# Patient Record
Sex: FEMALE | Race: WHITE | Employment: FULL TIME | URBAN - METROPOLITAN AREA
[De-identification: names, ages, dates, MRNs, and addresses within clinical notes are randomized per-mention and may not be internally consistent; named-entity substitution may affect disease eponyms.]

---

## 2017-08-31 ENCOUNTER — OFFICE VISIT (OUTPATIENT)
Dept: OBGYN CLINIC | Age: 47
End: 2017-08-31
Payer: COMMERCIAL

## 2017-08-31 VITALS
WEIGHT: 256.8 LBS | HEIGHT: 63 IN | SYSTOLIC BLOOD PRESSURE: 134 MMHG | DIASTOLIC BLOOD PRESSURE: 70 MMHG | BODY MASS INDEX: 45.5 KG/M2

## 2017-08-31 DIAGNOSIS — Z01.419 VISIT FOR GYNECOLOGIC EXAMINATION: Primary | ICD-10-CM

## 2017-08-31 DIAGNOSIS — N89.8 VAGINA ITCHING: ICD-10-CM

## 2017-08-31 DIAGNOSIS — Z12.31 ENCOUNTER FOR SCREENING MAMMOGRAM FOR BREAST CANCER: ICD-10-CM

## 2017-08-31 PROCEDURE — 99396 PREV VISIT EST AGE 40-64: CPT | Performed by: OBSTETRICS & GYNECOLOGY

## 2017-08-31 RX ORDER — GLIPIZIDE 5 MG/1
TABLET, EXTENDED RELEASE ORAL
COMMUNITY
Start: 2017-06-06 | End: 2017-11-30 | Stop reason: ALTCHOICE

## 2017-08-31 RX ORDER — LEVOTHYROXINE SODIUM 112 MCG
224 TABLET ORAL
COMMUNITY
Start: 2017-07-02

## 2017-08-31 RX ORDER — FLUCONAZOLE 150 MG/1
TABLET ORAL
COMMUNITY
Start: 2017-06-06 | End: 2017-08-31 | Stop reason: ALTCHOICE

## 2017-08-31 RX ORDER — FLUOXETINE HYDROCHLORIDE 40 MG/1
CAPSULE ORAL
COMMUNITY
Start: 2017-08-01 | End: 2019-02-06 | Stop reason: DRUGHIGH

## 2017-08-31 ASSESSMENT — ENCOUNTER SYMPTOMS
ABDOMINAL DISTENTION: 0
CONSTIPATION: 0
SHORTNESS OF BREATH: 0
COUGH: 0
DIARRHEA: 0
ABDOMINAL PAIN: 0
BACK PAIN: 0

## 2017-09-01 ENCOUNTER — TELEPHONE (OUTPATIENT)
Dept: OBGYN CLINIC | Age: 47
End: 2017-09-01

## 2017-09-01 DIAGNOSIS — N89.8 VAGINA ITCHING: ICD-10-CM

## 2017-09-01 DIAGNOSIS — B37.31 YEAST VAGINITIS: Primary | ICD-10-CM

## 2017-09-01 RX ORDER — FLUCONAZOLE 150 MG/1
150 TABLET ORAL
Qty: 4 TABLET | Refills: 0 | Status: SHIPPED | OUTPATIENT
Start: 2017-09-01 | End: 2017-11-30 | Stop reason: ALTCHOICE

## 2017-09-21 DIAGNOSIS — Z01.419 VISIT FOR GYNECOLOGIC EXAMINATION: ICD-10-CM

## 2017-09-26 ENCOUNTER — TELEPHONE (OUTPATIENT)
Dept: OBGYN CLINIC | Age: 47
End: 2017-09-26

## 2017-11-22 ENCOUNTER — TELEPHONE (OUTPATIENT)
Dept: OBGYN CLINIC | Age: 47
End: 2017-11-22

## 2017-11-22 NOTE — TELEPHONE ENCOUNTER
patient using preparation H cream and suppositories for hemoorhoids, thinks this has caused a yeast infection. Wonders if med could be sent to her pharmacy she would like more than one dose as she will be treating her hemorrhoids .  Pharmacy confirmed patient [phone]

## 2017-11-30 ENCOUNTER — OFFICE VISIT (OUTPATIENT)
Dept: OBGYN CLINIC | Age: 47
End: 2017-11-30
Payer: COMMERCIAL

## 2017-11-30 VITALS
WEIGHT: 255.4 LBS | HEIGHT: 63 IN | DIASTOLIC BLOOD PRESSURE: 68 MMHG | BODY MASS INDEX: 45.25 KG/M2 | SYSTOLIC BLOOD PRESSURE: 100 MMHG

## 2017-11-30 DIAGNOSIS — N76.0 ACUTE VAGINITIS: Primary | ICD-10-CM

## 2017-11-30 PROCEDURE — 99213 OFFICE O/P EST LOW 20 MIN: CPT | Performed by: NURSE PRACTITIONER

## 2017-12-01 DIAGNOSIS — N76.0 ACUTE VAGINITIS: ICD-10-CM

## 2017-12-01 RX ORDER — FLUCONAZOLE 150 MG/1
150 TABLET ORAL ONCE
Qty: 1 TABLET | Refills: 2 | Status: SHIPPED | OUTPATIENT
Start: 2017-12-01 | End: 2017-12-01

## 2019-02-06 ENCOUNTER — APPOINTMENT (OUTPATIENT)
Dept: GENERAL RADIOLOGY | Age: 49
End: 2019-02-06
Payer: COMMERCIAL

## 2019-02-06 ENCOUNTER — HOSPITAL ENCOUNTER (EMERGENCY)
Age: 49
Discharge: HOME | End: 2019-02-06
Attending: EMERGENCY MEDICINE
Payer: COMMERCIAL

## 2019-02-06 VITALS
OXYGEN SATURATION: 93 % | BODY MASS INDEX: 46.07 KG/M2 | WEIGHT: 260 LBS | SYSTOLIC BLOOD PRESSURE: 105 MMHG | RESPIRATION RATE: 16 BRPM | HEART RATE: 101 BPM | HEIGHT: 63 IN | DIASTOLIC BLOOD PRESSURE: 87 MMHG | TEMPERATURE: 98.4 F

## 2019-02-06 DIAGNOSIS — I47.1 PAROXYSMAL SUPRAVENTRICULAR TACHYCARDIA (HCC): ICD-10-CM

## 2019-02-06 DIAGNOSIS — E03.9 HYPOTHYROIDISM, UNSPECIFIED TYPE: Primary | ICD-10-CM

## 2019-02-06 LAB
-: NORMAL
ABSOLUTE EOS #: 0.2 K/UL (ref 0–0.4)
ABSOLUTE IMMATURE GRANULOCYTE: NORMAL K/UL (ref 0–0.3)
ABSOLUTE LYMPH #: 2.7 K/UL (ref 1–4.8)
ABSOLUTE MONO #: 0.3 K/UL (ref 0.2–0.8)
AMORPHOUS: NORMAL
AMPHETAMINE SCREEN URINE: NEGATIVE
ANION GAP SERPL CALCULATED.3IONS-SCNC: 19 MMOL/L (ref 9–17)
BACTERIA: NORMAL
BARBITURATE SCREEN URINE: NEGATIVE
BASOPHILS # BLD: 1 % (ref 0–2)
BASOPHILS ABSOLUTE: 0 K/UL (ref 0–0.2)
BENZODIAZEPINE SCREEN, URINE: NEGATIVE
BILIRUBIN URINE: NEGATIVE
BUN BLDV-MCNC: 9 MG/DL (ref 6–20)
BUN/CREAT BLD: 18 (ref 9–20)
BUPRENORPHINE URINE: NORMAL
CALCIUM SERPL-MCNC: 9 MG/DL (ref 8.6–10.4)
CANNABINOID SCREEN URINE: NEGATIVE
CASTS UA: NORMAL /LPF
CHLORIDE BLD-SCNC: 98 MMOL/L (ref 98–107)
CHP ED QC CHECK: NORMAL
CO2: 21 MMOL/L (ref 20–31)
COCAINE METABOLITE, URINE: NEGATIVE
COLOR: YELLOW
COMMENT UA: ABNORMAL
CREAT SERPL-MCNC: 0.5 MG/DL (ref 0.5–0.9)
CRYSTALS, UA: NORMAL /HPF
DIFFERENTIAL TYPE: NORMAL
EOSINOPHILS RELATIVE PERCENT: 3 % (ref 1–4)
EPITHELIAL CELLS UA: NORMAL /HPF (ref 0–5)
GFR AFRICAN AMERICAN: >60 ML/MIN
GFR NON-AFRICAN AMERICAN: >60 ML/MIN
GFR SERPL CREATININE-BSD FRML MDRD: ABNORMAL ML/MIN/{1.73_M2}
GFR SERPL CREATININE-BSD FRML MDRD: ABNORMAL ML/MIN/{1.73_M2}
GLUCOSE BLD-MCNC: 282 MG/DL (ref 70–99)
GLUCOSE URINE: ABNORMAL
HCT VFR BLD CALC: 41.5 % (ref 36–46)
HEMOGLOBIN: 13.8 G/DL (ref 12–16)
IMMATURE GRANULOCYTES: NORMAL %
INR BLD: 1.1
KETONES, URINE: NEGATIVE
LEUKOCYTE ESTERASE, URINE: NEGATIVE
LYMPHOCYTES # BLD: 42 % (ref 24–44)
MCH RBC QN AUTO: 27.1 PG (ref 26–34)
MCHC RBC AUTO-ENTMCNC: 33.2 G/DL (ref 31–37)
MCV RBC AUTO: 81.5 FL (ref 80–100)
MDMA URINE: NORMAL
METHADONE SCREEN, URINE: NEGATIVE
METHAMPHETAMINE, URINE: NORMAL
MONOCYTES # BLD: 5 % (ref 1–7)
MUCUS: NORMAL
NITRITE, URINE: NEGATIVE
NRBC AUTOMATED: NORMAL PER 100 WBC
OPIATES, URINE: NEGATIVE
OTHER OBSERVATIONS UA: NORMAL
OXYCODONE SCREEN URINE: NEGATIVE
PARTIAL THROMBOPLASTIN TIME: 26.2 SEC (ref 23–31)
PDW BLD-RTO: 14 % (ref 11.5–14.5)
PH UA: 5 (ref 5–8)
PHENCYCLIDINE, URINE: NEGATIVE
PLATELET # BLD: 394 K/UL (ref 130–400)
PLATELET ESTIMATE: NORMAL
PMV BLD AUTO: 6.5 FL (ref 6–12)
POTASSIUM SERPL-SCNC: 3.8 MMOL/L (ref 3.7–5.3)
PREGNANCY TEST URINE, POC: NEGATIVE
PROPOXYPHENE, URINE: NORMAL
PROTEIN UA: ABNORMAL
PROTHROMBIN TIME: 11.2 SEC (ref 9.7–11.6)
RBC # BLD: 5.09 M/UL (ref 4–5.2)
RBC # BLD: NORMAL 10*6/UL
RBC UA: NORMAL /HPF (ref 0–2)
RENAL EPITHELIAL, UA: NORMAL /HPF
SEG NEUTROPHILS: 49 % (ref 36–66)
SEGMENTED NEUTROPHILS ABSOLUTE COUNT: 3.3 K/UL (ref 1.8–7.7)
SODIUM BLD-SCNC: 138 MMOL/L (ref 135–144)
SPECIFIC GRAVITY UA: 1 (ref 1–1.03)
T4 TOTAL: 5.5 UG/DL (ref 4.5–12)
TEST INFORMATION: NORMAL
TRICHOMONAS: NORMAL
TRICYCLIC ANTIDEPRESSANTS, UR: NORMAL
TROPONIN INTERP: NORMAL
TROPONIN T: NORMAL NG/ML
TROPONIN, HIGH SENSITIVITY: <6 NG/L (ref 0–14)
TSH SERPL DL<=0.05 MIU/L-ACNC: 23.27 MIU/L (ref 0.3–5)
TURBIDITY: CLEAR
URINE HGB: NEGATIVE
UROBILINOGEN, URINE: NORMAL
WBC # BLD: 6.6 K/UL (ref 3.5–11)
WBC # BLD: NORMAL 10*3/UL
WBC UA: NORMAL /HPF (ref 0–5)
YEAST: NORMAL

## 2019-02-06 PROCEDURE — 84436 ASSAY OF TOTAL THYROXINE: CPT

## 2019-02-06 PROCEDURE — 99285 EMERGENCY DEPT VISIT HI MDM: CPT

## 2019-02-06 PROCEDURE — 80048 BASIC METABOLIC PNL TOTAL CA: CPT

## 2019-02-06 PROCEDURE — 80307 DRUG TEST PRSMV CHEM ANLYZR: CPT

## 2019-02-06 PROCEDURE — 36415 COLL VENOUS BLD VENIPUNCTURE: CPT

## 2019-02-06 PROCEDURE — 85025 COMPLETE CBC W/AUTO DIFF WBC: CPT

## 2019-02-06 PROCEDURE — 84703 CHORIONIC GONADOTROPIN ASSAY: CPT

## 2019-02-06 PROCEDURE — 81001 URINALYSIS AUTO W/SCOPE: CPT

## 2019-02-06 PROCEDURE — 85730 THROMBOPLASTIN TIME PARTIAL: CPT

## 2019-02-06 PROCEDURE — 2580000003 HC RX 258: Performed by: EMERGENCY MEDICINE

## 2019-02-06 PROCEDURE — 71045 X-RAY EXAM CHEST 1 VIEW: CPT

## 2019-02-06 PROCEDURE — 84484 ASSAY OF TROPONIN QUANT: CPT

## 2019-02-06 PROCEDURE — 85610 PROTHROMBIN TIME: CPT

## 2019-02-06 PROCEDURE — 93005 ELECTROCARDIOGRAM TRACING: CPT

## 2019-02-06 PROCEDURE — 6370000000 HC RX 637 (ALT 250 FOR IP): Performed by: EMERGENCY MEDICINE

## 2019-02-06 PROCEDURE — 84443 ASSAY THYROID STIM HORMONE: CPT

## 2019-02-06 RX ORDER — LEVOTHYROXINE SODIUM 0.05 MG/1
50 TABLET ORAL ONCE
Status: COMPLETED | OUTPATIENT
Start: 2019-02-06 | End: 2019-02-06

## 2019-02-06 RX ORDER — DILTIAZEM HYDROCHLORIDE 240 MG/1
240 CAPSULE, COATED, EXTENDED RELEASE ORAL 2 TIMES DAILY
Qty: 60 CAPSULE | Refills: 0 | Status: SHIPPED | OUTPATIENT
Start: 2019-02-06

## 2019-02-06 RX ORDER — GLIMEPIRIDE 4 MG/1
4 TABLET ORAL
COMMUNITY

## 2019-02-06 RX ORDER — QUETIAPINE FUMARATE 25 MG/1
25 TABLET, FILM COATED ORAL NIGHTLY
COMMUNITY

## 2019-02-06 RX ORDER — FLUOXETINE HYDROCHLORIDE 60 MG/1
60 TABLET, FILM COATED ORAL; ORAL EVERY MORNING
COMMUNITY

## 2019-02-06 RX ORDER — 0.9 % SODIUM CHLORIDE 0.9 %
1000 INTRAVENOUS SOLUTION INTRAVENOUS ONCE
Status: COMPLETED | OUTPATIENT
Start: 2019-02-06 | End: 2019-02-06

## 2019-02-06 RX ADMIN — SODIUM CHLORIDE 1000 ML: 9 INJECTION, SOLUTION INTRAVENOUS at 08:50

## 2019-02-06 RX ADMIN — LEVOTHYROXINE SODIUM 50 MCG: 50 TABLET ORAL at 10:26

## 2019-02-06 ASSESSMENT — ENCOUNTER SYMPTOMS
BACK PAIN: 0
ABDOMINAL PAIN: 0
EYE DISCHARGE: 0
FACIAL SWELLING: 0
CHEST TIGHTNESS: 0
SHORTNESS OF BREATH: 0
EYE PAIN: 0
ABDOMINAL DISTENTION: 0

## 2019-02-07 LAB
EKG ATRIAL RATE: 101 BPM
EKG P AXIS: 53 DEGREES
EKG P-R INTERVAL: 152 MS
EKG Q-T INTERVAL: 362 MS
EKG QRS DURATION: 86 MS
EKG QTC CALCULATION (BAZETT): 469 MS
EKG R AXIS: 12 DEGREES
EKG T AXIS: 40 DEGREES
EKG VENTRICULAR RATE: 101 BPM
HCG, PREGNANCY URINE (POC): NEGATIVE

## 2019-12-24 ENCOUNTER — HOSPITAL ENCOUNTER (OUTPATIENT)
Dept: PHYSICAL THERAPY | Facility: CLINIC | Age: 49
Setting detail: THERAPIES SERIES
Discharge: HOME | End: 2019-12-24
Payer: COMMERCIAL

## 2019-12-24 PROCEDURE — 97110 THERAPEUTIC EXERCISES: CPT

## 2019-12-24 PROCEDURE — 97140 MANUAL THERAPY 1/> REGIONS: CPT

## 2019-12-24 PROCEDURE — 97161 PT EVAL LOW COMPLEX 20 MIN: CPT

## 2019-12-30 ENCOUNTER — HOSPITAL ENCOUNTER (OUTPATIENT)
Dept: PHYSICAL THERAPY | Facility: CLINIC | Age: 49
Setting detail: THERAPIES SERIES
Discharge: HOME | End: 2019-12-30
Payer: COMMERCIAL

## 2019-12-30 PROCEDURE — 97110 THERAPEUTIC EXERCISES: CPT

## 2019-12-30 PROCEDURE — 97140 MANUAL THERAPY 1/> REGIONS: CPT

## 2019-12-31 ENCOUNTER — HOSPITAL ENCOUNTER (OUTPATIENT)
Dept: PHYSICAL THERAPY | Facility: CLINIC | Age: 49
Setting detail: THERAPIES SERIES
Discharge: HOME | End: 2019-12-31
Payer: COMMERCIAL

## 2020-01-02 ENCOUNTER — HOSPITAL ENCOUNTER (OUTPATIENT)
Dept: PHYSICAL THERAPY | Facility: CLINIC | Age: 50
Setting detail: THERAPIES SERIES
Discharge: HOME | End: 2020-01-02
Payer: COMMERCIAL

## 2020-01-02 PROCEDURE — 97110 THERAPEUTIC EXERCISES: CPT

## 2020-01-02 PROCEDURE — 97140 MANUAL THERAPY 1/> REGIONS: CPT

## 2020-01-02 NOTE — FLOWSHEET NOTE
[] [facility]) [facility] &  Therapy  [address]  [phone]  F: [phone] [x] [address]  [facility] [address]   [address]  P: [phone]  F: [phone] [] [address]  [address] [facility]  P: [phone]  F: [phone] [] [address]  [facility]   [address]   [place]: [phone]  F: [phone]      Physical Therapy Daily Treatment Note    Date:  2020  Patient Name:  Venu Latif    :  1970  MRN: [id number]  Physician: Shira Saenz MD                       Insurance: [id number] (3XH)  Medical Diagnosis: R knee sprain               Rehab Codes: M25.561, M25.661, M62.81, R26.2  Onset date: 19                         Next 's appt. : 19  Visit# / total visits: 3/   Cancels/No Shows: 1/0    Subjective:    Pain:  [x] Yes  [] No Location: R knee Pain Rating: (0-10 scale) 4/10  Pain altered Tx:  [] No  [] Yes  Action:  Comments: Pt reports having relief from pain for a day and a half after the previous session.      Objective:   Modalities:   Precautions: no squatting/kneeling  Exercises:  Exercise Reps/ Time Weight/ Level Comments   SciFit 5' L1 Not today         Supine      HS stretch 30\"x2   With strap   rectus/hip flexor stretch 30\"x3   Leg over EOB   Quad sets 5\"x15     SAQ 5\"x15     SLR --  Intense pain   gastroc stretch 30\"x3  Long sitting with strap   Hip add 5\"x15 ball    Hip abd 5\"x15 lime    bridges x15           SL      clamshells x10                 Prone      Quad stretch 30\"x3  With strap   HS curls x15     Hip ext x10  Knee extended         Standing      slantboard 30\"x3     3 way hip x15 ew lime          Other: SL hip abd increased pain     Manual therapy: \"the stick\" to the R quad, IT band, hamstring, gastroc x12' (5' of this time spent for just quad)     Specific Instructions for next treatment: Progress strengthening as tolerated, but avoid high levels of pain. Continue with stretches for RLE. Continue with manual therapy and consider adding for IT band/HS/gastroc. May consider adding resistance to some exercises next session       Treatment Charges: Mins Units Time In/Out   []  Modalities        [x]  Ther Exercise 32 2 6168-2192   [x]  Manual Therapy 12 1 2881-4051   []  Ther Activities      []  Aquatics      []  Vasocompression      []  Cervical Traction      Total Treatment time 44 min 3           Assessment: [x] Progressing toward goals. Able to progress pt today with bridges and standing exercises today with good pt tolerance and no increase in pain. Pt with good recall of exercises. Pt with reports that after exercise today, her pain had decreased to a 3/10 after manual her pain had decreased to a 0/10. Pt would continue to benefit from skilled PT interventions to decrease pain, increase strength, ROM, and overall functional mobility for improved quality of life. [] No change. [] Other:    STG: (to be met in 6 treatments)  1. ? Pain: Pt to decrease pain to no greater than 2/10 to improve quality of life. 2. ? Strength: Pt to increase R hip and knee strength to 4+/5 for all measurements to increase strength for amb/walking  3. ? Function: Pt to report increase in ability to negotiate stairs by 50% as well as complete sit <-> stand transfers without increase in pain to return to PLOF. 4. Independent with Home Exercise Programs  5. Demonstrate Knowledge of fall prevention  LTG: (to be met in 9 treatments)  6. ? Pain: Pt to decrease pain to no greater than 1/10 to improve quality of life. 7. ? Strength: Pt to increase R hip and knee strength to 5/5 for all measurements to increase strength for amb/walking  8. ? Function: Pt to improve LEFS score to no greater than 16% impairment to decrease pts perception of disability.   9. Pt to negotiate

## 2020-01-06 ENCOUNTER — HOSPITAL ENCOUNTER (OUTPATIENT)
Dept: PHYSICAL THERAPY | Facility: CLINIC | Age: 50
Setting detail: THERAPIES SERIES
Discharge: HOME | End: 2020-01-06
Payer: COMMERCIAL

## 2020-01-06 PROCEDURE — 97110 THERAPEUTIC EXERCISES: CPT

## 2020-01-06 PROCEDURE — 97140 MANUAL THERAPY 1/> REGIONS: CPT

## 2020-01-06 NOTE — FLOWSHEET NOTE
[] [facility]) [facility] CENTER &  Therapy  [address]  [phone]  F: [phone] [x] [address]  [place] [address]   [address]  P: [phone]  F: [phone] [] [place]  [address]  P: [phone]  F: [phone] [] [address]  [facility]   [address]   [place]: [phone]  F: [phone]      Physical Therapy Daily Treatment Note    Date:  2020  Patient Name:  Mikaela Milton    :  1970  MRN: [id number]  Physician: Farzana Boateng MD                       Insurance: [facility] (5QN)  Medical Diagnosis: R knee sprain               Rehab Codes: M25.561, M25.661, M62.81, R26.2  Onset date: 19                         Next 's appt. : 19  Visit# / total visits:    Cancels/No Shows: 1/0    Subjective:  Pain:  [x] Yes  [] No Location: R knee Pain Rating: (0-10 scale) 3/10  Pain altered Tx:  [] No  [] Yes  Action:  Comments: Pt arrives stating her knee is feeling really good today and the last few days. Objective:   Modalities:   Precautions: no squatting/kneeling  Exercises:  Exercise Reps/ Time Weight/ Level Comments   SciFit 5' L1 Not today         Supine      HS stretch 30\"x2   With strap   rectus/hip flexor stretch 30\"x3   Leg over EOB   Quad sets 5\"x15     SAQ 5\"x15     SLR 10x  Dull pain   gastroc stretch 30\"x3  Long sitting with strap   Hip add 5\"x15 ball    Hip abd 5\"x15 Blueberry    bridges 10x2  Progressed reps 1/6         SL      clamshells x15  Lime Added resistance 1/6             Prone      Quad stretch 30\"x3  With strap   HS curls 10x2 lime Added resistance 1/6   Hip ext 10x2  Knee extended         Standing      slantboard 30\"x3     3 way hip - bilateral  x15 ea lime Added bilaterally. 1/6         Other: SL hip abd increased pain     Manual therapy: \"the stick\" to the R quad, IT band, hamstring, gastroc x10'      Specific Instructions for next treatment: Progress strengthening as tolerated, but avoid high levels of pain. Continue with stretches for RLE. Continue with manual therapy and consider adding for IT band/HS/gastroc. Update HEP       Treatment Charges: Mins Units Time In/Out   []  Modalities        [x]  Ther Exercise 34 2 406-440   [x]  Manual Therapy 10 1 440-450   []  Ther Activities      []  Aquatics      []  Vasocompression      []  Cervical Traction      Total Treatment time 44 min 3     5 min on scifit for warm up not charged       Assessment: [x] Progressing toward goals. Pt denies pain end of session. Able to progress with good tolerance and increase resistance as indicated above. Pt required cueing to properly perform exercises. Pt does report dull ache through out session while completing exercises. Will continue to progress as able in future visits. [] No change. [] Other:    STG: (to be met in 6 treatments)  1. ? Pain: Pt to decrease pain to no greater than 2/10 to improve quality of life. 2. ? Strength: Pt to increase R hip and knee strength to 4+/5 for all measurements to increase strength for amb/walking  3. ? Function: Pt to report increase in ability to negotiate stairs by 50% as well as complete sit <-> stand transfers without increase in pain to return to PLOF. 4. Independent with Home Exercise Programs  5. Demonstrate Knowledge of fall prevention  LTG: (to be met in 9 treatments)  6. ? Pain: Pt to decrease pain to no greater than 1/10 to improve quality of life. 7. ? Strength: Pt to increase R hip and knee strength to 5/5 for all measurements to increase strength for amb/walking  8. ? Function: Pt to improve LEFS score to no greater than 16% impairment to decrease pts perception of disability. 9. Pt to negotiate stairs with reciprocal pattern and be able to complete homemaking tasks with no difficulty to return to PLOF.

## 2020-01-08 ENCOUNTER — HOSPITAL ENCOUNTER (OUTPATIENT)
Dept: PHYSICAL THERAPY | Facility: CLINIC | Age: 50
Setting detail: THERAPIES SERIES
Discharge: HOME | End: 2020-01-08
Payer: COMMERCIAL

## 2020-01-09 ENCOUNTER — HOSPITAL ENCOUNTER (OUTPATIENT)
Dept: PHYSICAL THERAPY | Facility: CLINIC | Age: 50
Setting detail: THERAPIES SERIES
Discharge: HOME | End: 2020-01-09
Payer: COMMERCIAL

## 2020-01-09 PROCEDURE — 97110 THERAPEUTIC EXERCISES: CPT

## 2020-01-09 PROCEDURE — 97140 MANUAL THERAPY 1/> REGIONS: CPT

## 2020-01-09 NOTE — FLOWSHEET NOTE
[] [facility]) [facility] CENTER &  Therapy  [address]  [phone]  F: [phone] [x] [address]  [place] [address]   [address]  P: [phone]  F: [phone] [] [place]  [address]  P: [phone]  F: [phone] [] [address]  [facility]   [address]   [place]: [phone]  F: [phone]      Physical Therapy Daily Treatment Note    Date:  2020  Patient Name:  Kathie Chavez    :  1970  MRN: [id number]  Physician: Cody Guerrero MD                       Insurance: [facility] (9KF)  Medical Diagnosis: R knee sprain               Rehab Codes: M25.561, M25.661, M62.81, R26.2  Onset date: 19                         Next 's appt. : 19  Visit# / total visits:    Cancels/No Shows: 2/0    Subjective:  Pain:  [x] Yes  [] No Location: R knee Pain Rating: (0-10 scale) 3.5-4/10  Pain altered Tx:  [] No  [] Yes  Action:  Comments: Pt arrives stating her knee is feeling really good today and the last few days. Objective:   Modalities:   Precautions: no squatting/kneeling  Exercises:  Exercise Reps/ Time Weight/ Level Comments   SciFit 5' L1          Supine      HS stretch 30\"x2   With strap   rectus/hip flexor stretch 30\"x3   Leg over EOB   Quad sets 5\"x15     SAQ 5\"x15 1#    SLR 10x  Dull pain   gastroc stretch 30\"x3 Slant board today. Long sitting with strap   Hip add 5\"x20 ball Progressed 1/9   Hip abd 5\"x20 Blueberry Progressed 1/9   bridges 10x2  Progressed reps 1/6         SL      clamshells 10x2  Lime Added resistance 1/6   Hip abd x15               Prone      Quad stretch 30\"x3  With strap   HS curls 10x2 lime Added resistance 1/6   Hip ext 10x2  Knee extended         Standing      slantboard 30\"x3     3 way hip - bilateral  x15 ea lime Added bilaterally.  reciprocal pattern and be able to complete homemaking tasks with no difficulty to return to PLOF. Patient goals: Doyle New get mobility back\"      Pt. Education:  [x] Yes  [] No  [x] Reviewed Prior HEP/Ed  Method of Education: [x] Verbal  [x] Demo  [] Written  Comprehension of Education:  [x] Verbalizes understanding. [x] Demonstrates understanding. [] Needs review. [] Demonstrates/verbalizes HEP/Ed previously given. 12/30- Added quad sets, SAQ, gastroc stretch, hip add, hip abd, HS curls, and hip ext to HEP    Plan: [x] Continue per plan of care.    [] Other:      Time In: 400pm            Time Out: 450 pm    Electronically signed by:  Serene Perez PTA

## 2020-01-14 ENCOUNTER — HOSPITAL ENCOUNTER (OUTPATIENT)
Dept: PHYSICAL THERAPY | Facility: CLINIC | Age: 50
Setting detail: THERAPIES SERIES
Discharge: HOME | End: 2020-01-14
Payer: COMMERCIAL

## 2020-01-14 PROCEDURE — 97110 THERAPEUTIC EXERCISES: CPT

## 2020-01-14 PROCEDURE — 97140 MANUAL THERAPY 1/> REGIONS: CPT

## 2020-01-14 NOTE — FLOWSHEET NOTE
[] [facility]) [facility] CENTER &  Therapy  [address]  [phone]  F: [phone] [x] [address]  [facility]   [address]  P: [phone]  F: [phone] [] [place]  [address] [facility]  P: [phone]  F: [phone] [] [address]  [facility]   [address]   [place]: [phone]  F: [phone]      Physical Therapy Daily Treatment Note    Date:  2020  Patient Name:  Denae Serrano    :  1970  MRN: [id number]  Physician: Tonya Valderrama MD                       Insurance: [facility] (0PI)  Medical Diagnosis: R knee sprain               Rehab Codes: M25.561, M25.661, M62.81, R26.2  Onset date: 19                         Next Dr's appt. : 19  Visit# / total visits:    Cancels/No Shows: 2/0    Subjective:  Pain:  [x] Yes  [] No Location: R knee Pain Rating: (0-10 scale) 4/10  Pain altered Tx:  [] No  [] Yes  Action:  Comments: pt arrives reporting she was in pain all weekend. Pt reports the pain was dull and achy and rates it 9/10. States that nothing she did relieved the pain and had to take 800 mg motrin. States that today pain is better but still there. Objective:   Modalities:   Precautions: no squatting/kneeling  Exercises:  Exercise Reps/ Time Weight/ Level Comments   SciFit 5' L1          Supine      HS stretch 30\"x2   With strap   rectus/hip flexor stretch 30\"x3   Leg over EOB   Quad sets 5\"x15     SAQ 5\"x15 1#    SLR 10x  Dull pain   gastroc stretch 30\"x3 Slant board today.   Long sitting with strap   Hip add 5\"x20 ball Progressed    Hip abd 5\"x20 Plum Progressed  progressed    bridges 10x2  Progressed reps          SL      clamshells 10x2  Lime Added resistance 1/6   Hip abd x15               Prone      Quad stretch 30\"x3  With strap   HS curls 10x2 lime Added resistance 1/6   Hip ext 10x2  Knee extended         Standing      slantboard 30\"x3     3 way hip - bilateral  x15 ea lime Added bilaterally. 1/6   Step up 10x 6\" Dull pain difficult held 1/13   Step down  5x  6\" Dull pain difficult held 1/13   Other:      Manual therapy: \"the stick\" to the R quad, IT band, hamstring, gastroc x10'      Specific Instructions for next treatment: Progress strengthening as tolerated, but avoid high levels of pain. Continue with stretches for RLE. Continue with manual therapy and consider adding for IT band/HS/gastroc. Update HEP       Treatment Charges: Mins Units Time In/Out   []  Modalities        [x]  Ther Exercise 34 2 407-441   [x]  Manual Therapy 10 1 441-451   []  Ther Activities      []  Aquatics      []  Vasocompression      []  Cervical Traction      Total Treatment time 44 min 3     5 min on scifit for warm up not charged       Assessment: [x] Progressing toward goals. Pt with moderate tolerance to treatment. Able to complete exercises however did note pain through out. Pt states the pain continues to be a dull ache feeling. Held stairs this date due to patient reporting these cause most pain. Intermittent cueing to perform exercises correctly. [] No change. [] Other:    STG: (to be met in 6 treatments)  1. ? Pain: Pt to decrease pain to no greater than 2/10 to improve quality of life. 2. ? Strength: Pt to increase R hip and knee strength to 4+/5 for all measurements to increase strength for amb/walking  3. ? Function: Pt to report increase in ability to negotiate stairs by 50% as well as complete sit <-> stand transfers without increase in pain to return to PLOF. 4. Independent with Home Exercise Programs  5. Demonstrate Knowledge of fall prevention  LTG: (to be met in 9 treatments)  6. ? Pain: Pt to decrease pain to no greater than 1/10 to improve quality of life.   7. ? Strength: Pt to increase R hip and knee strength to 5/5 for all measurements to increase strength for amb/walking  8. ? Function: Pt to improve LEFS score to no greater than 16% impairment to decrease pts perception of disability. 9. Pt to negotiate stairs with reciprocal pattern and be able to complete homemaking tasks with no difficulty to return to PLOF. Patient goals: Camille Mata get mobility back\"      Pt. Education:  [x] Yes  [] No  [x] Reviewed Prior HEP/Ed  Method of Education: [x] Verbal  [x] Demo  [] Written  Comprehension of Education:  [x] Verbalizes understanding. [x] Demonstrates understanding. [] Needs review. [] Demonstrates/verbalizes HEP/Ed previously given. 12/30- Added quad sets, SAQ, gastroc stretch, hip add, hip abd, HS curls, and hip ext to HEP    Plan: [x] Continue per plan of care.    [] Other:      Time In: 400pm            Time Out: 452 pm    Electronically signed by:  Harjeet Barbour PTA

## 2020-01-16 ENCOUNTER — HOSPITAL ENCOUNTER (OUTPATIENT)
Dept: PHYSICAL THERAPY | Facility: CLINIC | Age: 50
Setting detail: THERAPIES SERIES
Discharge: HOME | End: 2020-01-16
Payer: COMMERCIAL

## 2020-01-16 PROCEDURE — 97110 THERAPEUTIC EXERCISES: CPT

## 2020-01-16 PROCEDURE — 97140 MANUAL THERAPY 1/> REGIONS: CPT

## 2020-01-16 NOTE — FLOWSHEET NOTE
[] [facility] CENTER &  Therapy  [address]  [phone]  F: [phone] [x] [address]  [facility] [address]   [address]  P: [phone]  F: [phone] [] [address]  [address]  P: [phone]  F: [phone] [] [address]  [facility]   [address]   [place]: [phone]  F: [phone]      Physical Therapy Daily Treatment Note    Date:  2020  Patient Name:  Eulogio Calle    :  1970  MRN: [id number]  Physician: Shelli Shore MD                       Insurance: [address] [place] [address] ([facility])  Medical Diagnosis: R knee sprain               Rehab Codes: M25.561, M25.661, M62.81, R26.2  Onset date: 19                         Next 's appt. : 19  Visit# / total visits:    Cancels/No Shows: 2/0    Subjective:  Pain:  [x] Yes  [] No Location: R knee Pain Rating: (0-10 scale) 0/10  Pain altered Tx:  [] No  [] Yes  Action:  Comments: Pt arrives stating she has not had pain at all today. Objective:   Modalities:   Precautions: no squatting/kneeling Exercises:  Exercise Reps/ Time Weight/ Level Comments   SciFit 5' L1          Supine      HS stretch 30\"x2   With strap   rectus/hip flexor stretch 30\"x3   Leg over EOB   Quad sets 5\"x15     SAQ 5\"x15 1#    SLR 10x  Dull pain   gastroc stretch 30\"x3 Slant board today. Long sitting with strap   Hip add 5\"x20 ball Progressed 1/   Hip abd 5\"x20 Plum Progressed 1/9 progressed 1/14   bridges 10x2  Progressed reps 1/6         SL      clamshells 10x2  Lime Added resistance 1/6   Hip abd x15               Prone      Quad stretch 30\"x3  With strap   HS curls 10x2 lime Added resistance 1/6   Hip ext 10x2  Knee extended         Standing      slantboard 30\"x3     3 way hip RLE only today x15 ea lime Added bilaterally. 1/6   Step up 10x 6\" Dull pain difficult held 1/13   Step down  5x  6\" Dull pain difficult held 1/13   Other:      Manual therapy: \"the stick\" to the R quad, IT band, hamstring, gastroc x12'      Specific Instructions for next treatment: Progress strengthening as tolerated, but avoid high levels of pain. Continue with stretches for RLE. Continue with manual therapy and consider adding for IT band/HS/gastroc. Update HEP       Treatment Charges: Mins Units Time In/Out   []  Modalities        [x]  Ther Exercise 34 2 406-440   [x]  Manual Therapy 12 1 441-453   []  Ther Activities      []  Aquatics      []  Vasocompression      []  Cervical Traction      Total Treatment time 46 min 3     5 min on scifit for warm up not charged       Assessment: [x] Progressing toward goals. Pt with moderate tolerance to treatment this date. Pt unable to complete full reps of side lying hip abduction due to pain quad. Patient reports pain is burning/dull; likely muscle fatigue. Pt educated on muscle soreness and DOMS. [] No change. [] Other: Educated patient on use of muscle roller at home if able to purchase. STG: (to be met in 6 treatments)  1. ? Pain: Pt to decrease pain to no greater than 2/10 to improve quality of life. 2. ? Strength: Pt to increase R hip and knee strength to 4+/5 for all measurements to increase strength for amb/walking  3. ? Function: Pt to report increase in ability to negotiate stairs by 50% as well as complete sit <-> stand transfers without increase in pain to return to PLOF. 4. Independent with Home Exercise Programs  5. Demonstrate Knowledge of fall prevention  LTG: (to be met in 9 treatments)  6. ? Pain: Pt to decrease pain to no greater than 1/10 to improve quality of life. 7. ? Strength: Pt to increase R hip and knee strength to 5/5 for all measurements to increase strength for amb/walking  8. ? Function: Pt to improve LEFS score to no greater than 16% impairment to decrease pts perception of disability.   9. Pt to negotiate stairs with reciprocal pattern and be able to complete homemaking tasks with no difficulty to return to PLOF. Patient goals: [place] get mobility back\"      Pt. Education:  [x] Yes  [] No  [x] Reviewed Prior HEP/Ed  Method of Education: [x] Verbal  [x] Demo  [] Written  Comprehension of Education:  [x] Verbalizes understanding. [x] Demonstrates understanding. [] Needs review. [] Demonstrates/verbalizes HEP/Ed previously given. 12/30- Added quad sets, SAQ, gastroc stretch, hip add, hip abd, HS curls, and hip ext to HEP    Plan: [x] Continue per plan of care.    [] Other:      Time In: 400pm            Time Out: 531 pm    Electronically signed by:  Mejia Ortiz PTA

## 2020-01-21 ENCOUNTER — HOSPITAL ENCOUNTER (OUTPATIENT)
Dept: PHYSICAL THERAPY | Facility: CLINIC | Age: 50
Setting detail: THERAPIES SERIES
Discharge: HOME | End: 2020-01-21
Payer: COMMERCIAL

## 2020-01-21 NOTE — FLOWSHEET NOTE
[] [address]  [address]    [phone]  F: [phone]   [x] [address]  [address] [facility]   [address]  P: [phone]  F: [phone]  [] [address]  [address] [facility]  P: [phone]  F: [phone]  [] [address]   [address]   [place]: [phone]  F: [phone]   [] [facility]) [facility] & Therapy  [address] [facility] [address] [facility] [address]  [place]: [phone]   F: [phone]     Physical Therapy Cancel/No Show note    Date: 2020  Patient: Yael Samuels  : 1970  MRN: [id number]    Cancels/No Shows to date: 3/0    For today's appointment patient:    [x]  Cancelled    [] Rescheduled appointment    [] No-show     Reason given by patient:    []  Patient ill    []  Conflicting appointment    [] No transportation      [] Conflict with work    [x] No reason given    [] Weather related    [] Other:      Comments:        [x] Next appointment was confirmed    Electronically signed by: Elsy Meyers PT

## 2020-02-04 ENCOUNTER — HOSPITAL ENCOUNTER (OUTPATIENT)
Dept: PHYSICAL THERAPY | Facility: CLINIC | Age: 50
Setting detail: THERAPIES SERIES
Discharge: HOME | End: 2020-02-04
Payer: COMMERCIAL

## 2020-02-04 PROCEDURE — 97110 THERAPEUTIC EXERCISES: CPT

## 2020-02-04 NOTE — DISCHARGE SUMMARY
[] [facility]  [address]  [phone]  F: [phone] [x] [address]  [facility]   [address]  P: [phone]  F: [phone] [] [place]  [address]  P: [phone]  F: [phone] [] [address]  [facility]   [address]   [place]: [phone]  F: [phone]      Physical Therapy Discharge Note    Date: 2020      Patient: Kathie Chavez  : 1970  MRN: [id number]: BIUFSNRW Dory Yuen MD                       Insurance: [facility] (9vs)  Medical Diagnosis: R knee sprain               Rehab Codes: M25.561, M25.661, M62.81, R26.2  Onset YQWQ: [id number]                          Next 's appt. : 19  Visit# / total visits:                                   Cancels/No Shows: 3/1  Date of initial visit: 19                Date of final visit: 20     Subjective:  Pain:  [x]? Yes  []? No     Location: R knee         Pain Rating: (0-10 scale) 4/10  Pain altered Tx:  []? No  []? Yes  Action:  Comments: Pt arrives to the clinic reporting her R knee is okay. Pt states overall, she is 95% better. Pt states the only thing that still bothers her is stairs. Pt states she does have 13 stairs to enter her apartment. Pt states she has days where she does not have pain and then also has days where pain is 4/10 at max. Pt notes her pain is achy and does not have any sharp pains. Pt notes she is not doing her exercises at home. Pt states she is having triple bypass surgery on Monday and is wishing to be discharged from therapy.     Objective:  Re-assessment 20     Special testing: negative anterior/posterior drawer, clarke's  Palpation: denies tenderness in the R knee nor complaints of tenderness in the R quad  MMT- LLE 5/5 unless otherwise stated  Hip flexion: 5/5  SLR: 5/5  Hip abduction: 4-/5 R (pain top of thigh); 4+/5L  IR: 3/5  (pain on the top of thigh, unable to apply resistance)  ER: 4+/5 (pain on the top of the thigh)  Extension: 4+/5  Hamstrin/5  Quad: 5/5     LEFI: 67.5% max function (54/80)     Stairs: reciprocal gait pattern with use of R hand rail- pain with ascending  Sit to stands: able to complete without use of UEs, pt reports mild favoring of the LLE.        Assessment:  Nanette Schumacher has completed 8/9 physical therapy visits for R knee pain with reports of 95% improvement in pain. Pt reports she continues to have some difficulty with stair negotiation, however, it is minimal. Pt reports she is not completing her HEP and is having an unrelated surgical procedure in 1 week. Upon reassessment, pt demonstrates improvements in LE strength and function with an improved LEFI score from 33% max function to 67.5% max function. Pt continues to report R anterior thigh pain with resisted hip abduction, IR, and ER and reports a noted weakness in the RLE compared to the LLE. At this time, pt will be discharged from therapy and was educated to continue completing HEP as allowed following her upcoming surgical procedure. STG: (to be met in 6 treatments)  1. ? Pain: Pt to decrease pain to no greater than 2/10 to improve quality of life. NOT MET 2020  2. ? Strength: Pt to increase R hip and knee strength to 4+/5 for all measurements to increase strength for amb/walking PARTIALLY MET 2020  3. ? Function: Pt to report increase in ability to negotiate stairs by 50% as well as complete sit <-> stand transfers without increase in pain to return to PLOF. MET 2020  4. Independent with Home Exercise Programs NOT MET 2020  5. Demonstrate Knowledge of fall prevention  LTG: (to be met in 9 treatments)  6. ? Pain: Pt to decrease pain to no greater than 1/10 to improve quality of life.  NOT MET 2020  7. ? Strength: Pt to increase R